# Patient Record
Sex: FEMALE | Race: WHITE | NOT HISPANIC OR LATINO | Employment: FULL TIME | URBAN - METROPOLITAN AREA
[De-identification: names, ages, dates, MRNs, and addresses within clinical notes are randomized per-mention and may not be internally consistent; named-entity substitution may affect disease eponyms.]

---

## 2024-06-07 ENCOUNTER — OFFICE VISIT (OUTPATIENT)
Dept: URGENT CARE | Facility: URGENT CARE | Age: 59
End: 2024-06-07
Payer: COMMERCIAL

## 2024-06-07 VITALS
SYSTOLIC BLOOD PRESSURE: 129 MMHG | OXYGEN SATURATION: 98 % | TEMPERATURE: 97.7 F | DIASTOLIC BLOOD PRESSURE: 76 MMHG | HEART RATE: 58 BPM

## 2024-06-07 DIAGNOSIS — L24.9 IRRITANT CONTACT DERMATITIS, UNSPECIFIED TRIGGER: Primary | ICD-10-CM

## 2024-06-07 DIAGNOSIS — L29.9 ITCHING: ICD-10-CM

## 2024-06-07 PROCEDURE — 99203 OFFICE O/P NEW LOW 30 MIN: CPT | Performed by: PHYSICIAN ASSISTANT

## 2024-06-07 RX ORDER — TRIAMCINOLONE ACETONIDE 5 MG/G
1 OINTMENT TOPICAL 2 TIMES DAILY
Qty: 45 G | Refills: 0 | Status: SHIPPED | OUTPATIENT
Start: 2024-06-07

## 2024-06-07 RX ORDER — CETIRIZINE HYDROCHLORIDE 10 MG/1
10 TABLET ORAL DAILY
Qty: 30 TABLET | Refills: 0 | Status: SHIPPED | OUTPATIENT
Start: 2024-06-07

## 2024-06-07 NOTE — PROGRESS NOTES
Assessment & Plan     Irritant contact dermatitis, unspecified trigger    Contact dermatitis is caused by exposure to a substance that irritates your skin or triggers an allergic reaction. The substance could be one of thousands of known allergens and irritants. Often people have irritant and allergic reactions at the same time. Irritant contact dermatitis is the most common type  Contact dermatitis is not passed from person to person.  General care  Stay cool, anything warm or hot can cause itching to be worse.  Cold compresses for 30 minutes 3 times a day.  For severe itching in a small area, apply an ice pack wrapped in a thin towel.     - triamcinolone (KENALOG) 0.5 % external ointment; Apply 1 g topically 2 times daily  - Adult Dermatology  Referral; Future    Itching    Start on zyrtec  - cetirizine (ZYRTEC) 10 MG tablet; Take 1 tablet (10 mg) by mouth daily        CONSULTATION/REFERRAL to dermatology if not better    No follow-ups on file.    Subjective   Deborrah is a 58 year old, presenting for the following health issues:  Urgent Care (Rash, itching  on both arms x 2 weeks )    HPI     Review of Systems  Constitutional, HEENT, cardiovascular, pulmonary, gi and gu systems are negative, except as otherwise noted.      Objective    /76   Pulse 58   Temp 97.7  F (36.5  C) (Tympanic)   LMP  (LMP Unknown)   SpO2 98%   There is no height or weight on file to calculate BMI.  Physical Exam   GENERAL: alert and no distress  SKIN: pos for itchy raised rash in antecubital areas bilaterally  NEURO: Normal strength and tone, mentation intact and speech normal  PSYCH: mentation appears normal, affect normal/bright          Signed Electronically by: Oc Hanna, [facility], [facility]